# Patient Record
Sex: MALE
[De-identification: names, ages, dates, MRNs, and addresses within clinical notes are randomized per-mention and may not be internally consistent; named-entity substitution may affect disease eponyms.]

---

## 2019-08-09 PROBLEM — Z00.00 ENCOUNTER FOR PREVENTIVE HEALTH EXAMINATION: Status: ACTIVE | Noted: 2019-08-09

## 2019-08-26 ENCOUNTER — APPOINTMENT (OUTPATIENT)
Dept: SURGERY | Facility: CLINIC | Age: 84
End: 2019-08-26
Payer: MEDICARE

## 2019-08-26 DIAGNOSIS — Z87.891 PERSONAL HISTORY OF NICOTINE DEPENDENCE: ICD-10-CM

## 2019-08-26 DIAGNOSIS — I25.10 ATHEROSCLEROTIC HEART DISEASE OF NATIVE CORONARY ARTERY W/OUT ANGINA PECTORIS: ICD-10-CM

## 2019-08-26 DIAGNOSIS — K80.20 CALCULUS OF GALLBLADDER W/OUT CHOLECYSTITIS W/OUT OBSTRUCTION: ICD-10-CM

## 2019-08-26 DIAGNOSIS — I48.91 UNSPECIFIED ATRIAL FIBRILLATION: ICD-10-CM

## 2019-08-26 DIAGNOSIS — Z78.9 OTHER SPECIFIED HEALTH STATUS: ICD-10-CM

## 2019-08-26 DIAGNOSIS — I10 ESSENTIAL (PRIMARY) HYPERTENSION: ICD-10-CM

## 2019-08-26 DIAGNOSIS — Z80.6 FAMILY HISTORY OF LEUKEMIA: ICD-10-CM

## 2019-08-26 DIAGNOSIS — I73.9 PERIPHERAL VASCULAR DISEASE, UNSPECIFIED: ICD-10-CM

## 2019-08-26 PROCEDURE — 99202 OFFICE O/P NEW SF 15 MIN: CPT

## 2019-08-27 PROBLEM — Z78.9 CAFFEINE USE: Status: ACTIVE | Noted: 2019-08-26

## 2019-08-27 PROBLEM — I10 HYPERTENSION: Status: ACTIVE | Noted: 2019-08-26

## 2019-08-27 PROBLEM — I73.9 PAD (PERIPHERAL ARTERY DISEASE): Status: ACTIVE | Noted: 2019-08-26

## 2019-08-27 PROBLEM — Z80.6 FAMILY HISTORY OF LEUKEMIA: Status: ACTIVE | Noted: 2019-08-26

## 2019-08-27 PROBLEM — K80.20 GALLSTONES: Status: ACTIVE | Noted: 2019-08-26

## 2019-08-27 PROBLEM — Z78.9 SOCIAL ALCOHOL USE: Status: ACTIVE | Noted: 2019-08-26

## 2019-08-27 PROBLEM — Z87.891 FORMER SMOKER: Status: ACTIVE | Noted: 2019-08-26

## 2019-08-27 PROBLEM — I25.10 ASHD (ARTERIOSCLEROTIC HEART DISEASE): Status: ACTIVE | Noted: 2019-08-27

## 2019-08-27 PROBLEM — I48.91 ATRIAL FIBRILLATION, UNSPECIFIED TYPE: Status: ACTIVE | Noted: 2019-08-27

## 2019-08-27 RX ORDER — DABIGATRAN ETEXILATE MESYLATE 110 MG/1
CAPSULE ORAL
Refills: 0 | Status: ACTIVE | COMMUNITY

## 2019-08-27 RX ORDER — METOPROLOL TARTRATE 25 MG/1
25 TABLET, FILM COATED ORAL
Refills: 0 | Status: ACTIVE | COMMUNITY

## 2019-08-27 RX ORDER — CILOSTAZOL 100 MG/1
100 TABLET ORAL
Refills: 0 | Status: ACTIVE | COMMUNITY

## 2019-08-27 RX ORDER — LISINOPRIL 30 MG/1
TABLET ORAL
Refills: 0 | Status: ACTIVE | COMMUNITY

## 2019-08-27 NOTE — REASON FOR VISIT
[Initial Evaluation] : an initial evaluation [Family Member] : family member [FreeTextEntry1] : gallstones

## 2019-08-27 NOTE — PHYSICAL EXAM
[Normal Thyroid] : the thyroid was normal [Abdominal Masses] : No abdominal masses [Normal Breath Sounds] : Normal breath sounds [Abdomen Tenderness] : ~T ~M No abdominal tenderness [No HSM] : no hepatosplenomegaly [de-identified] : alert and in no distress, anicteric. [de-identified] : no adenopathy [de-identified] : irregular rhythm with systolic murmur [de-identified] : Soft and not distended. The gallbladder is not palpable, Schulz's sign negative. No CVA tenderness. No hernias noted

## 2019-08-27 NOTE — ASSESSMENT
[FreeTextEntry1] : The picture is consistent with symptomatic chronic cholecystitis and cholelithiasis, pending confirmation with imaging studies. Although his appetite is poor he should supplement with liquid oral supplements to try to avoid further weight loss. His daughter will obtain reports of the emergency department workup for me and they should return to Dr. Tao for reevaluation in case there is a recommendation for surgery, especially in light of his advanced age.  Appropriate precautions and warning signs were advised for the interim and further management decisions will be pending completion of the above. All their questions were answered and they understand and agree.

## 2019-08-27 NOTE — HISTORY OF PRESENT ILLNESS
[de-identified] : The patient comes with his daughter to be evaluated for treatment of his gallstones. Recently he presented to the emergency department at [facility] with anorexia and right-sided abdominal pain radiating to the back. He was found to be significantly dehydrated with some renal dysfunction but this seemed to improve with hydration and he was discharged after an overnight stay. He had no acute cardiac event and his workup did reveal gallstones. He has had a recent 20 pound weight loss but has no other specific biliary symptoms. There is no history of hepatitis, pancreatitis or jaundice. He still has some upper abdominal pain after meals and still has significant anorexia. He also complains of some pain in the right groin.

## 2019-08-27 NOTE — DATA REVIEWED
[FreeTextEntry1] : The patient presents with a discharge summary but no diagnostic reports are available; they were asked to obtain the blood work and imaging study reports from that overnight stay

## 2019-09-03 ENCOUNTER — RESULT REVIEW (OUTPATIENT)
Age: 84
End: 2019-09-03